# Patient Record
Sex: FEMALE | Race: WHITE | Employment: FULL TIME | URBAN - METROPOLITAN AREA
[De-identification: names, ages, dates, MRNs, and addresses within clinical notes are randomized per-mention and may not be internally consistent; named-entity substitution may affect disease eponyms.]

---

## 2020-01-05 LAB
A/G RATIO: 0.8 (ref 1.1–2.2)
ALBUMIN SERPL-MCNC: 3.2 G/DL (ref 3.4–5)
ALP BLD-CCNC: 111 U/L (ref 40–129)
ALT SERPL-CCNC: 17 U/L (ref 10–40)
ANION GAP SERPL CALCULATED.3IONS-SCNC: 14 MMOL/L (ref 3–16)
ANISOCYTOSIS: ABNORMAL
AST SERPL-CCNC: 15 U/L (ref 15–37)
BACTERIA: ABNORMAL /HPF
BASOPHILS ABSOLUTE: 0 K/UL (ref 0–0.2)
BASOPHILS RELATIVE PERCENT: 0.3 %
BILIRUB SERPL-MCNC: 0.3 MG/DL (ref 0–1)
BILIRUBIN URINE: ABNORMAL
BLOOD, URINE: ABNORMAL
BUN BLDV-MCNC: 13 MG/DL (ref 7–20)
CALCIUM SERPL-MCNC: 10.3 MG/DL (ref 8.3–10.6)
CHLORIDE BLD-SCNC: 98 MMOL/L (ref 99–110)
CLARITY: ABNORMAL
CO2: 20 MMOL/L (ref 21–32)
COLOR: YELLOW
CREAT SERPL-MCNC: 0.7 MG/DL (ref 0.6–1.1)
EOSINOPHILS ABSOLUTE: 0 K/UL (ref 0–0.6)
EOSINOPHILS RELATIVE PERCENT: 0.4 %
EPITHELIAL CELLS, UA: 3 /HPF (ref 0–5)
GFR AFRICAN AMERICAN: >60
GFR NON-AFRICAN AMERICAN: >60
GLOBULIN: 3.9 G/DL
GLUCOSE BLD-MCNC: 132 MG/DL (ref 70–99)
GLUCOSE URINE: NEGATIVE MG/DL
HCG QUALITATIVE: NEGATIVE
HCT VFR BLD CALC: 34.6 % (ref 36–48)
HEMATOLOGY PATH CONSULT: YES
HEMOGLOBIN: 11.2 G/DL (ref 12–16)
HYALINE CASTS: 5 /LPF (ref 0–8)
KETONES, URINE: NEGATIVE MG/DL
LEUKOCYTE ESTERASE, URINE: ABNORMAL
LIPASE: 15 U/L (ref 13–60)
LYMPHOCYTES ABSOLUTE: 1.8 K/UL (ref 1–5.1)
LYMPHOCYTES RELATIVE PERCENT: 13.3 %
MCH RBC QN AUTO: 22.5 PG (ref 26–34)
MCHC RBC AUTO-ENTMCNC: 32.3 G/DL (ref 31–36)
MCV RBC AUTO: 69.8 FL (ref 80–100)
MICROCYTES: ABNORMAL
MICROSCOPIC EXAMINATION: YES
MONOCYTES ABSOLUTE: 0.9 K/UL (ref 0–1.3)
MONOCYTES RELATIVE PERCENT: 6.9 %
NEUTROPHILS ABSOLUTE: 10.4 K/UL (ref 1.7–7.7)
NEUTROPHILS RELATIVE PERCENT: 79.1 %
NITRITE, URINE: POSITIVE
OVALOCYTES: ABNORMAL
PDW BLD-RTO: 16.8 % (ref 12.4–15.4)
PH UA: 5.5 (ref 5–8)
PLATELET # BLD: 287 K/UL (ref 135–450)
PLATELET SLIDE REVIEW: ADEQUATE
PMV BLD AUTO: 8.2 FL (ref 5–10.5)
POTASSIUM SERPL-SCNC: 3.8 MMOL/L (ref 3.5–5.1)
PROTEIN UA: >=300 MG/DL
RBC # BLD: 4.96 M/UL (ref 4–5.2)
RBC UA: 7 /HPF (ref 0–4)
SLIDE REVIEW: ABNORMAL
SODIUM BLD-SCNC: 132 MMOL/L (ref 136–145)
SPECIFIC GRAVITY UA: >1.03 (ref 1–1.03)
TOTAL PROTEIN: 7.1 G/DL (ref 6.4–8.2)
URINE REFLEX TO CULTURE: YES
URINE TYPE: ABNORMAL
UROBILINOGEN, URINE: 0.2 E.U./DL
WBC # BLD: 13.1 K/UL (ref 4–11)
WBC UA: 621 /HPF (ref 0–5)

## 2020-01-05 PROCEDURE — 81001 URINALYSIS AUTO W/SCOPE: CPT

## 2020-01-05 PROCEDURE — 85025 COMPLETE CBC W/AUTO DIFF WBC: CPT

## 2020-01-05 PROCEDURE — 84703 CHORIONIC GONADOTROPIN ASSAY: CPT

## 2020-01-05 PROCEDURE — 85610 PROTHROMBIN TIME: CPT

## 2020-01-05 PROCEDURE — 87077 CULTURE AEROBIC IDENTIFY: CPT

## 2020-01-05 PROCEDURE — 83690 ASSAY OF LIPASE: CPT

## 2020-01-05 PROCEDURE — 80053 COMPREHEN METABOLIC PANEL: CPT

## 2020-01-05 PROCEDURE — 93005 ELECTROCARDIOGRAM TRACING: CPT | Performed by: EMERGENCY MEDICINE

## 2020-01-05 PROCEDURE — 87186 SC STD MICRODIL/AGAR DIL: CPT

## 2020-01-05 PROCEDURE — 87086 URINE CULTURE/COLONY COUNT: CPT

## 2020-01-06 ENCOUNTER — ANESTHESIA (OUTPATIENT)
Dept: OPERATING ROOM | Age: 45
DRG: 854 | End: 2020-01-06
Payer: COMMERCIAL

## 2020-01-06 ENCOUNTER — APPOINTMENT (OUTPATIENT)
Dept: CT IMAGING | Age: 45
DRG: 854 | End: 2020-01-06
Payer: COMMERCIAL

## 2020-01-06 ENCOUNTER — APPOINTMENT (OUTPATIENT)
Dept: GENERAL RADIOLOGY | Age: 45
DRG: 854 | End: 2020-01-06
Payer: COMMERCIAL

## 2020-01-06 ENCOUNTER — HOSPITAL ENCOUNTER (INPATIENT)
Age: 45
LOS: 2 days | Discharge: HOME | DRG: 854 | End: 2020-01-08
Attending: EMERGENCY MEDICINE | Admitting: INTERNAL MEDICINE
Payer: COMMERCIAL

## 2020-01-06 ENCOUNTER — ANESTHESIA EVENT (OUTPATIENT)
Dept: OPERATING ROOM | Age: 45
DRG: 854 | End: 2020-01-06
Payer: COMMERCIAL

## 2020-01-06 VITALS
SYSTOLIC BLOOD PRESSURE: 145 MMHG | RESPIRATION RATE: 9 BRPM | OXYGEN SATURATION: 100 % | DIASTOLIC BLOOD PRESSURE: 83 MMHG

## 2020-01-06 PROBLEM — N20.0 NEPHROLITHIASIS: Status: ACTIVE | Noted: 2020-01-06

## 2020-01-06 LAB
EKG ATRIAL RATE: 134 BPM
EKG DIAGNOSIS: NORMAL
EKG P AXIS: 52 DEGREES
EKG P-R INTERVAL: 134 MS
EKG Q-T INTERVAL: 266 MS
EKG QRS DURATION: 86 MS
EKG QTC CALCULATION (BAZETT): 397 MS
EKG R AXIS: 7 DEGREES
EKG T AXIS: 47 DEGREES
EKG VENTRICULAR RATE: 134 BPM
HEMATOLOGY PATH CONSULT: NORMAL
INR BLD: 1.19 (ref 0.86–1.14)
LACTIC ACID, SEPSIS: 0.9 MMOL/L (ref 0.4–1.9)
PROTHROMBIN TIME: 13.8 SEC (ref 10–13.2)

## 2020-01-06 PROCEDURE — 2500000003 HC RX 250 WO HCPCS: Performed by: NURSE ANESTHETIST, CERTIFIED REGISTERED

## 2020-01-06 PROCEDURE — 6360000004 HC RX CONTRAST MEDICATION: Performed by: PHYSICIAN ASSISTANT

## 2020-01-06 PROCEDURE — 6360000002 HC RX W HCPCS: Performed by: NURSE ANESTHETIST, CERTIFIED REGISTERED

## 2020-01-06 PROCEDURE — 6360000002 HC RX W HCPCS: Performed by: INTERNAL MEDICINE

## 2020-01-06 PROCEDURE — 2580000003 HC RX 258: Performed by: EMERGENCY MEDICINE

## 2020-01-06 PROCEDURE — 2580000003 HC RX 258: Performed by: PHYSICIAN ASSISTANT

## 2020-01-06 PROCEDURE — 74177 CT ABD & PELVIS W/CONTRAST: CPT

## 2020-01-06 PROCEDURE — 3700000000 HC ANESTHESIA ATTENDED CARE: Performed by: UROLOGY

## 2020-01-06 PROCEDURE — 87186 SC STD MICRODIL/AGAR DIL: CPT

## 2020-01-06 PROCEDURE — C1769 GUIDE WIRE: HCPCS | Performed by: UROLOGY

## 2020-01-06 PROCEDURE — C2617 STENT, NON-COR, TEM W/O DEL: HCPCS | Performed by: UROLOGY

## 2020-01-06 PROCEDURE — 7100000001 HC PACU RECOVERY - ADDTL 15 MIN: Performed by: UROLOGY

## 2020-01-06 PROCEDURE — 3600000014 HC SURGERY LEVEL 4 ADDTL 15MIN: Performed by: UROLOGY

## 2020-01-06 PROCEDURE — 6370000000 HC RX 637 (ALT 250 FOR IP): Performed by: EMERGENCY MEDICINE

## 2020-01-06 PROCEDURE — 3700000001 HC ADD 15 MINUTES (ANESTHESIA): Performed by: UROLOGY

## 2020-01-06 PROCEDURE — 3600000004 HC SURGERY LEVEL 4 BASE: Performed by: UROLOGY

## 2020-01-06 PROCEDURE — 96375 TX/PRO/DX INJ NEW DRUG ADDON: CPT

## 2020-01-06 PROCEDURE — BT1F1ZZ FLUOROSCOPY OF LEFT KIDNEY, URETER AND BLADDER USING LOW OSMOLAR CONTRAST: ICD-10-PCS | Performed by: UROLOGY

## 2020-01-06 PROCEDURE — 74420 UROGRAPHY RTRGR +-KUB: CPT

## 2020-01-06 PROCEDURE — 87150 DNA/RNA AMPLIFIED PROBE: CPT

## 2020-01-06 PROCEDURE — 6360000002 HC RX W HCPCS: Performed by: PHYSICIAN ASSISTANT

## 2020-01-06 PROCEDURE — 96366 THER/PROPH/DIAG IV INF ADDON: CPT

## 2020-01-06 PROCEDURE — 6360000002 HC RX W HCPCS: Performed by: EMERGENCY MEDICINE

## 2020-01-06 PROCEDURE — 6360000002 HC RX W HCPCS: Performed by: ANESTHESIOLOGY

## 2020-01-06 PROCEDURE — 7100000000 HC PACU RECOVERY - FIRST 15 MIN: Performed by: UROLOGY

## 2020-01-06 PROCEDURE — 93010 ELECTROCARDIOGRAM REPORT: CPT | Performed by: INTERNAL MEDICINE

## 2020-01-06 PROCEDURE — 2580000003 HC RX 258: Performed by: INTERNAL MEDICINE

## 2020-01-06 PROCEDURE — 2580000003 HC RX 258: Performed by: UROLOGY

## 2020-01-06 PROCEDURE — 99285 EMERGENCY DEPT VISIT HI MDM: CPT

## 2020-01-06 PROCEDURE — 6370000000 HC RX 637 (ALT 250 FOR IP): Performed by: UROLOGY

## 2020-01-06 PROCEDURE — 96367 TX/PROPH/DG ADDL SEQ IV INF: CPT

## 2020-01-06 PROCEDURE — 2709999900 HC NON-CHARGEABLE SUPPLY: Performed by: UROLOGY

## 2020-01-06 PROCEDURE — 1200000000 HC SEMI PRIVATE

## 2020-01-06 PROCEDURE — 87040 BLOOD CULTURE FOR BACTERIA: CPT

## 2020-01-06 PROCEDURE — 6360000004 HC RX CONTRAST MEDICATION: Performed by: UROLOGY

## 2020-01-06 PROCEDURE — 83605 ASSAY OF LACTIC ACID: CPT

## 2020-01-06 PROCEDURE — 0T778DZ DILATION OF LEFT URETER WITH INTRALUMINAL DEVICE, VIA NATURAL OR ARTIFICIAL OPENING ENDOSCOPIC: ICD-10-PCS | Performed by: UROLOGY

## 2020-01-06 PROCEDURE — 94760 N-INVAS EAR/PLS OXIMETRY 1: CPT

## 2020-01-06 PROCEDURE — 96365 THER/PROPH/DIAG IV INF INIT: CPT

## 2020-01-06 PROCEDURE — C1758 CATHETER, URETERAL: HCPCS | Performed by: UROLOGY

## 2020-01-06 DEVICE — STENT URET 6FR L26CM PERCFLX HYDR+ TAPR TIP GRAD: Type: IMPLANTABLE DEVICE | Status: FUNCTIONAL

## 2020-01-06 RX ORDER — TAMSULOSIN HYDROCHLORIDE 0.4 MG/1
0.4 CAPSULE ORAL DAILY
Status: DISCONTINUED | OUTPATIENT
Start: 2020-01-06 | End: 2020-01-08 | Stop reason: HOSPADM

## 2020-01-06 RX ORDER — SODIUM CHLORIDE 0.9 % (FLUSH) 0.9 %
10 SYRINGE (ML) INJECTION EVERY 12 HOURS SCHEDULED
Status: DISCONTINUED | OUTPATIENT
Start: 2020-01-06 | End: 2020-01-08 | Stop reason: HOSPADM

## 2020-01-06 RX ORDER — ACETAMINOPHEN 500 MG
1000 TABLET ORAL ONCE
Status: COMPLETED | OUTPATIENT
Start: 2020-01-06 | End: 2020-01-06

## 2020-01-06 RX ORDER — HYDROMORPHONE HCL 110MG/55ML
0.5 PATIENT CONTROLLED ANALGESIA SYRINGE INTRAVENOUS EVERY 5 MIN PRN
Status: DISCONTINUED | OUTPATIENT
Start: 2020-01-06 | End: 2020-01-06 | Stop reason: HOSPADM

## 2020-01-06 RX ORDER — SUCCINYLCHOLINE/SOD CL,ISO/PF 200MG/10ML
SYRINGE (ML) INTRAVENOUS PRN
Status: DISCONTINUED | OUTPATIENT
Start: 2020-01-06 | End: 2020-01-06 | Stop reason: SDUPTHER

## 2020-01-06 RX ORDER — KETOROLAC TROMETHAMINE 30 MG/ML
30 INJECTION, SOLUTION INTRAMUSCULAR; INTRAVENOUS ONCE
Status: COMPLETED | OUTPATIENT
Start: 2020-01-06 | End: 2020-01-06

## 2020-01-06 RX ORDER — SODIUM CHLORIDE 0.9 % (FLUSH) 0.9 %
10 SYRINGE (ML) INJECTION PRN
Status: DISCONTINUED | OUTPATIENT
Start: 2020-01-06 | End: 2020-01-08 | Stop reason: HOSPADM

## 2020-01-06 RX ORDER — ACETAMINOPHEN 325 MG/1
650 TABLET ORAL EVERY 4 HOURS PRN
Status: DISCONTINUED | OUTPATIENT
Start: 2020-01-06 | End: 2020-01-08 | Stop reason: HOSPADM

## 2020-01-06 RX ORDER — 0.9 % SODIUM CHLORIDE 0.9 %
30 INTRAVENOUS SOLUTION INTRAVENOUS ONCE
Status: COMPLETED | OUTPATIENT
Start: 2020-01-06 | End: 2020-01-06

## 2020-01-06 RX ORDER — PROMETHAZINE HYDROCHLORIDE 25 MG/ML
6.25 INJECTION, SOLUTION INTRAMUSCULAR; INTRAVENOUS
Status: DISCONTINUED | OUTPATIENT
Start: 2020-01-06 | End: 2020-01-06 | Stop reason: HOSPADM

## 2020-01-06 RX ORDER — SODIUM CHLORIDE 9 MG/ML
INJECTION, SOLUTION INTRAVENOUS CONTINUOUS
Status: DISCONTINUED | OUTPATIENT
Start: 2020-01-06 | End: 2020-01-07

## 2020-01-06 RX ORDER — ONDANSETRON 2 MG/ML
4 INJECTION INTRAMUSCULAR; INTRAVENOUS ONCE
Status: COMPLETED | OUTPATIENT
Start: 2020-01-06 | End: 2020-01-06

## 2020-01-06 RX ORDER — SODIUM CHLORIDE 0.9 % (FLUSH) 0.9 %
10 SYRINGE (ML) INJECTION EVERY 12 HOURS SCHEDULED
Status: DISCONTINUED | OUTPATIENT
Start: 2020-01-06 | End: 2020-01-06 | Stop reason: HOSPADM

## 2020-01-06 RX ORDER — ONDANSETRON 2 MG/ML
4 INJECTION INTRAMUSCULAR; INTRAVENOUS EVERY 6 HOURS PRN
Status: DISCONTINUED | OUTPATIENT
Start: 2020-01-06 | End: 2020-01-08 | Stop reason: HOSPADM

## 2020-01-06 RX ORDER — MAGNESIUM HYDROXIDE 1200 MG/15ML
LIQUID ORAL
Status: COMPLETED | OUTPATIENT
Start: 2020-01-06 | End: 2020-01-06

## 2020-01-06 RX ORDER — LIDOCAINE HYDROCHLORIDE 20 MG/ML
INJECTION, SOLUTION EPIDURAL; INFILTRATION; INTRACAUDAL; PERINEURAL PRN
Status: DISCONTINUED | OUTPATIENT
Start: 2020-01-06 | End: 2020-01-06 | Stop reason: SDUPTHER

## 2020-01-06 RX ORDER — FENTANYL CITRATE 50 UG/ML
25 INJECTION, SOLUTION INTRAMUSCULAR; INTRAVENOUS EVERY 5 MIN PRN
Status: DISCONTINUED | OUTPATIENT
Start: 2020-01-06 | End: 2020-01-06 | Stop reason: HOSPADM

## 2020-01-06 RX ORDER — PROPOFOL 10 MG/ML
INJECTION, EMULSION INTRAVENOUS PRN
Status: DISCONTINUED | OUTPATIENT
Start: 2020-01-06 | End: 2020-01-06 | Stop reason: SDUPTHER

## 2020-01-06 RX ORDER — SODIUM CHLORIDE 9 MG/ML
1000 INJECTION, SOLUTION INTRAVENOUS CONTINUOUS
Status: DISCONTINUED | OUTPATIENT
Start: 2020-01-06 | End: 2020-01-06

## 2020-01-06 RX ORDER — LABETALOL HYDROCHLORIDE 5 MG/ML
5 INJECTION, SOLUTION INTRAVENOUS EVERY 10 MIN PRN
Status: DISCONTINUED | OUTPATIENT
Start: 2020-01-06 | End: 2020-01-06 | Stop reason: HOSPADM

## 2020-01-06 RX ORDER — PHENAZOPYRIDINE HYDROCHLORIDE 100 MG/1
200 TABLET, FILM COATED ORAL
Status: DISCONTINUED | OUTPATIENT
Start: 2020-01-06 | End: 2020-01-08 | Stop reason: HOSPADM

## 2020-01-06 RX ORDER — SODIUM CHLORIDE 0.9 % (FLUSH) 0.9 %
10 SYRINGE (ML) INJECTION PRN
Status: DISCONTINUED | OUTPATIENT
Start: 2020-01-06 | End: 2020-01-06 | Stop reason: HOSPADM

## 2020-01-06 RX ADMIN — IOPAMIDOL 75 ML: 755 INJECTION, SOLUTION INTRAVENOUS at 01:56

## 2020-01-06 RX ADMIN — LIDOCAINE HYDROCHLORIDE 5 ML: 20 INJECTION, SOLUTION EPIDURAL; INFILTRATION; INTRACAUDAL; PERINEURAL at 08:08

## 2020-01-06 RX ADMIN — Medication 1 G: at 20:14

## 2020-01-06 RX ADMIN — SODIUM CHLORIDE: 9 INJECTION, SOLUTION INTRAVENOUS at 18:59

## 2020-01-06 RX ADMIN — PHENAZOPYRIDINE HYDROCHLORIDE 200 MG: 100 TABLET ORAL at 20:14

## 2020-01-06 RX ADMIN — SODIUM CHLORIDE 1000 ML: 9 INJECTION, SOLUTION INTRAVENOUS at 03:57

## 2020-01-06 RX ADMIN — ACETAMINOPHEN 1000 MG: 500 TABLET, FILM COATED ORAL at 03:58

## 2020-01-06 RX ADMIN — PROPOFOL 200 MG: 10 INJECTION, EMULSION INTRAVENOUS at 08:08

## 2020-01-06 RX ADMIN — KETOROLAC TROMETHAMINE 30 MG: 30 INJECTION, SOLUTION INTRAMUSCULAR at 01:11

## 2020-01-06 RX ADMIN — FENTANYL CITRATE 100 MCG: 50 INJECTION, SOLUTION INTRAMUSCULAR; INTRAVENOUS at 08:08

## 2020-01-06 RX ADMIN — Medication 180 MG: at 08:08

## 2020-01-06 RX ADMIN — GENTAMICIN SULFATE 379.6 MG: 40 INJECTION, SOLUTION INTRAMUSCULAR; INTRAVENOUS at 03:58

## 2020-01-06 RX ADMIN — ONDANSETRON 4 MG: 2 INJECTION INTRAMUSCULAR; INTRAVENOUS at 01:11

## 2020-01-06 RX ADMIN — SODIUM CHLORIDE 1365 ML: 9 INJECTION, SOLUTION INTRAVENOUS at 01:11

## 2020-01-06 RX ADMIN — Medication 10 ML: at 20:15

## 2020-01-06 RX ADMIN — CEFEPIME HYDROCHLORIDE 2 G: 2 INJECTION, POWDER, FOR SOLUTION INTRAVENOUS at 01:11

## 2020-01-06 ASSESSMENT — ENCOUNTER SYMPTOMS
RESPIRATORY NEGATIVE: 1
BACK PAIN: 0
EYE DISCHARGE: 0
NAUSEA: 1
COUGH: 0
VOMITING: 1
ABDOMINAL PAIN: 1
CONSTIPATION: 0
DIARRHEA: 0
EYE REDNESS: 0
SHORTNESS OF BREATH: 0
COLOR CHANGE: 0

## 2020-01-06 ASSESSMENT — PAIN - FUNCTIONAL ASSESSMENT: PAIN_FUNCTIONAL_ASSESSMENT: 0-10

## 2020-01-06 ASSESSMENT — PAIN SCALES - GENERAL
PAINLEVEL_OUTOF10: 0
PAINLEVEL_OUTOF10: 8
PAINLEVEL_OUTOF10: 0
PAINLEVEL_OUTOF10: 8
PAINLEVEL_OUTOF10: 0

## 2020-01-06 NOTE — ED PROVIDER NOTES
I independently performed a history and physical on SAMMY Avery 54. All diagnostic, treatment, and disposition decisions were made by myself in conjunction with the advanced practice provider. Briefly, this is a 38 y.o. female here for approximately 4 days of left-sided pain and fevers. Patient thought that it might of been due to her gastric surgery. The pain became intolerable. Fevers were difficult to control so she decided to come to the emergency room. On exam, nontoxic-appearing adult female who is now comfortable after having received pain medicines. She was febrile. Tachycardic. Blood pressure is stable. Has a left CVA tenderness. EKG  The Ekg interpreted by me in the absence of a cardiologist shows:    No significant change from prior EKG dated         Screenings            Critical Time  Total Critical Care time was 35 minutes, excluding separately reportable procedures. There was a high probability of clinically significant/life threatening deterioration in the patient's condition which required my urgent intervention. MDM  This patient again and appeared to be septic. She is given 30 cc/kg bolus of normal saline based on ideal body weight and given cefepime based on the likely source of urinary tract. CT scan showed an obstructing stone. The patient has a confirmed urinary tract infection. Urology is consulted. They recommend admission for stent. Patient is made n.p.o. She is planning stents once and gentamicin is added on per urology's recommendations. This patient discussed with the hospitalist on duty. The patient is agreeable with the plan for admission. Patient Referrals:  No follow-up provider specified. Discharge Medications:  New Prescriptions    No medications on file       FINAL IMPRESSION  1. Septicemia ([address]    2. Hydronephrosis with urinary obstruction due to ureteral calculus    3.  Pyelonephritis        Blood pressure 120/87, pulse 99, temperature 99.2 °F (37.3 °C), resp. rate 18, height 5' (1.524 m), weight 267 lb 12.8 oz (121.5 kg), SpO2 96 %. For further details of [facility] emergency department encounter, please see documentation by advanced practice provider.       Leeanne Goodman MD  01/06/20 [id number]

## 2020-01-06 NOTE — CONSULTS
surgically absent.  The  liver, spleen, pancreas, adrenal glands, kidneys, are otherwise unremarkable  in appearance. GI/Bowel: The appendix is not clearly visualized and no evidence of acute  appendicitis is noted.  The stomach is unremarkable without wall thickening  or distention.  Bowel loops are unremarkable in appearance without evidence  of obstruction, distension or mucosal thickening. Pelvis: Suspected multifocal uterine leiomyomata are seen with the largest at  the right fundus measuring up to 3.9 cm in diameter.  The uterus is  anteverted in position; intrauterine contraceptive device appears well  positioned within the endometrial canal.  Urinary bladder is mildly distended  but grossly unremarkable.  No evidence of pelvic free fluid is seen.  No  pelvic or inguinal lymphadenopathy is identified. Peritoneum/Retroperitoneum: No evidence of retroperitoneal or intraperitoneal  lymphadenopathy is identified.  No evidence of intraperitoneal free fluid is  seen. Bones/Soft Tissues: The bones, skeletal muscle bundles, fascial planes and  subcutaneous soft tissues are unremarkable in appearance. Impression:     1. Mid left ureteral obstructing 6 mm diameter calculus with associated  moderate left ureterectasis and moderate left hydronephrosis, with findings  as discussed above. 2. Diffuse fatty infiltration of liver. 3. Hepatomegaly. 4. Suspected multifocal uterine leiomyomata, as discussed above. 5. Cholecystectomy.          Janina Michelle MD  1/6/2020

## 2020-01-06 NOTE — ED PROVIDER NOTES
[facility]        Pt Name: Morro Siu  MRN: [id number]  [place] 1975  Date of evaluation: 1/5/2020  Provider: LJ Carney  PCP: No primary care provider on file. This patient was seen and evaluated by the attending physician Rolan Calles       Chief Complaint   Patient presents with    Fever     started with L sided lower abdominal pain on friday, then with fever. pain is better but fever is still there off and on. HISTORY OF PRESENT ILLNESS   (Location/Symptom, Timing/Onset, Context/Setting, Quality, Duration, Modifying Factors, Severity)  Note limiting factors. Morro Siu is a 38 y.o. female with no significant past medical history who presents to the ED with plan of a fever. Patient states has had a fever that began on Friday. States she started having some left-sided lower abdominal pain and left-sided flank pain. Patient states she has had history of gastric bypass and states she is also had cholecystectomy. Patient became concerned due to persistent fever and came to the ED for further evaluation and treatment. Has been taking Tylenol but states due to her gastric bypass she is unsure how much she is actually absorbing. Patient has not tried any other over-the-counter antipyretics. States she has had nausea with vomiting. States decreased oral intake. Denies chest pain, shortness of breath, cough, rashes/lesions, dysuria, frequency, urgency, hematuria, vaginal bleeding, vaginal discharge or changes in bowel movements. Patient states aching pain rated 4/10 to her left flank. Denies sick contacts or recent travel. Denies any other surgeries to her abdomen in the past.    Nursing Notes were all reviewed and agreed with or any disagreements were addressed in the HPI.     REVIEW OF SYSTEMS    (2-9 systems for level 4, 10 or more for level 5)     Review of Systems Constitutional: Positive for fever. Negative for activity change, appetite change and chills. Eyes: Negative for discharge and redness. Respiratory: Negative. Negative for cough and shortness of breath. Cardiovascular: Negative. Negative for chest pain. Gastrointestinal: Positive for abdominal pain, nausea and vomiting. Negative for constipation and diarrhea. Genitourinary: Positive for flank pain. Negative for decreased urine volume, difficulty urinating, dysuria, frequency, hematuria, pelvic pain, urgency, vaginal bleeding, vaginal discharge and vaginal pain. Musculoskeletal: Negative for arthralgias, back pain, myalgias, neck pain and neck stiffness. Skin: Negative for color change, pallor, rash and wound. Neurological: Negative for dizziness, light-headedness and headaches. Positives and Pertinent negatives as per HPI. Except as noted above in the ROS, all other systems were reviewed and negative. PAST MEDICAL HISTORY   History reviewed. No pertinent past medical history. SURGICAL HISTORY     Past Surgical History:   Procedure Laterality Date    CHOLECYSTECTOMY      GASTRIC BYPASS SURGERY      TYMPANOPLASTY           CURRENTMEDICATIONS       Previous Medications    No medications on file         ALLERGIES     Latex; Adhesive tape; Codeine; Other; and Percocet [oxycodone-acetaminophen]    FAMILYHISTORY     History reviewed. No pertinent family history.        SOCIAL HISTORY       Social History     Tobacco Use    Smoking status: Never Smoker    Smokeless tobacco: Never Used   Substance Use Topics    Alcohol use: Not Currently    Drug use: Never       SCREENINGS             PHYSICAL EXAM    (up to 7 for level 4, 8 or more for level 5)     ED Triage Vitals   BP Temp Temp Source Pulse Resp SpO2 Height Weight   01/05/20 1957 01/05/20 1957 01/05/20 1957 01/05/20 1957 01/05/20 1957 01/05/20 1957 01/05/20 1957 01/05/20 2003   (!) 147/98 98.5 °F (36.9 °C) Oral 145 18 96 % 5' flank pain. Complaint of left lower quadrant with left flank pain with associated fever. Work-up obtained per triage. Unfortunately patient had extended stay in the waiting room and upon my evaluation patient only had major completion of work-up at this time. Urinalysis did show 4+ bacteria with 621 white blood cells and 7 red blood cells. Positive nitrite and concerning for potential pyelonephritis/UTI given history. Patient tachycardic with fever here in the ED. IV established patient given fluid bolus at 30 mL/kg bolus based on ideal body weight given BMI greater than 30. Patient given Toradol for fever control. Also given Zofran and cefepime given what appears to be concern for potential sepsis from what appears to be UTI versus pyelonephritis. Lactic acid normal.  Blood cultures pending at this time. CBC showed white count 13.1 with normal platelets. Hemoglobin 11.2. CMP showed CO2 20 but otherwise unremarkable. Pregnancy negative. Lipase normal.  INR 1.19. EKG interpreted by attending physician. CT of the abdomen pelvis with IV contrast ordered and pending at this time. Given end of shift will sign out to attending provider for further valuation/disposition. Patient has concern for sepsis with tachycardia, fever, documented infection with UTI/pyelonephritis and leukocytosis. Patient already treated with 30 mL/kg bolus of fluid and Toradol/cefepime here in the ED. Pending CT of the abdomen pelvis and disposition pending further evaluation by attending. Given end of shift will sign out to 10 provider. Please see their documentation for further disposition and treatment of patient. FINAL IMPRESSION    No diagnosis found. DISPOSITION/PLAN   DISPOSITION        PATIENT REFERREDTO:  No follow-up provider specified.     DISCHARGE MEDICATIONS:  New Prescriptions    No medications on file       DISCONTINUED MEDICATIONS:  Discontinued Medications    No medications on file

## 2020-01-06 NOTE — OP NOTE
Urology Operative Report  [facility]    Provider: Shell Schofield MD Patient ID:  Admission Date: 2020 Name: Morro Siu  OR Date: 2020  MRN: [id number]   Patient Location: OR/NONE : 1975  Attending: Atif Doan MD Date of Service: 2020  PCP: No primary care provider on file. Date of Operation: 2020    Preoperative Diagnosis:   1. LEFT side ureteral stone  2. Urosepsis    Postoperative Diagnosis: same    Procedure:    1. Cystoscopy  2. Left side ureteral stent placement  3. Fluoroscopic imaging < 1 hr physician time  4. LEFT side retrograde ureteropyelogram    Surgeon:   Shell Schofield MD    Anesthesia: General LMA anesthesia    Indications: Morro Siu is a 38 y.o. female who presents for the above named surgery. Informed consent was obtained and the risks, benefits, and details of the procedure were explained to the patient who elected to proceed. Details of Procedure: The patient was brought to the operating room and placed in the supine position on the operating room table. SCDs were placed on the lower extremities. Following induction of anesthesia the patient was positioned in a lithotomy position, all pressure points were padded, and the genitals were prepped and draped in the usual sterile fashion. A routine timeout was performed, confirming the patient, procedure, site, risk of fire, patient allergies and confirming that preoperative antibiotics had been administered prior to beginning. A 21 fr rigid cystoscope was advance via a normal appearing urethra into the bladder. The bladder was inspected and there were no suspicious lesions, stones or diverticula seen. Attention was turned to the left ureteral orifice. A 0.035 sensor wire was advance into the left UO and up to the renal pelvis under control of fluoroscopy. There was slight resistance felt at the expected level of the stone based on the CT scan.  Over the wire a 5 fr pollock

## 2020-01-06 NOTE — ANESTHESIA POSTPROCEDURE EVALUATION
[facility] Department of Anesthesiology  Post-Anesthesia Note       Name:  Neel Pimentel                                  Age:  38 y.o. MRN:  [id number]     Last Vitals & Oxygen Saturation: /86   Pulse 111   Temp 98.6 °F (37 °C) (Temporal)   Resp 14   Ht 5' (1.524 m)   Wt 267 lb 12.8 oz (121.5 kg)   SpO2 97%   BMI 52.30 kg/m²   Patient Vitals for the past 4 hrs:   BP Temp Temp src Pulse Resp SpO2   01/06/20 0900 -- -- -- -- 14 --   01/06/20 0845 137/86 -- -- 111 14 97 %   01/06/20 0840 (!) 149/93 98.6 °F (37 °C) Temporal 109 13 100 %   01/06/20 0835 (!) 137/115 -- -- 115 16 100 %   01/06/20 0830 124/88 -- -- 128 18 100 %   01/06/20 0825 136/82 98.4 °F (36.9 °C) Temporal 126 18 99 %   01/06/20 0735 (!) 150/96 100.1 °F (37.8 °C) Temporal 121 20 99 %   01/06/20 0630 127/68 -- -- 102 -- 95 %   01/06/20 0615 -- -- -- -- -- 95 %   01/06/20 0600 127/72 -- -- -- -- 95 %   01/06/20 0545 -- -- -- -- -- 94 %   01/06/20 0530 113/66 -- -- -- -- 94 %       Level of consciousness:  Awake, alert    Respiratory: Respirations easy, no distress. Stable. Cardiovascular: Hemodynamically stable. Hydration: Adequate. PONV: Adequately managed. Post-op pain: Adequately controlled. Post-op assessment: Tolerated anesthetic well without complication. Complications:  None.     Zackery De Dios MD  January 6, 2020   9:17 AM

## 2020-01-06 NOTE — H&P
HOSPITALISTS HISTORY AND PHYSICAL    1/6/2020 2:59 PM    Patient Information:  Linda Huerta is a 38 y.o. female [id number]  PCP:  No primary care provider on file. (Tel: None )    Chief complaint:    Chief Complaint   Patient presents with    Fever     started with L sided lower abdominal pain on friday, then with fever. pain is better but fever is still there off and on. History of Present Illness:  Tania Lopez is a 38 y.o. female who presented with history of fevers of 101.4 associated with chills and sweats. She later on developed left flank pain that was mild not worse or better with anything. She also had multiple episodes of vomiting unable to take p.o. intake. Patient denies any sick contacts or recent travel. The patient decided to keep him to the ED as she is not improving. The ED CT scan was done showed left hydronephrosis with 6 mm stone and patient was taken to the OR by urology and had a left ureteral stent placed            REVIEW OF SYSTEMS:   Constitutional: The above  ENT: Negative for rhinorrhea, epistaxis, hoarseness, sore throat. Respiratory: Negative for shortness of breath,wheezing  Cardiovascular: Negative for chest pain, palpitations   Gastrointestinal: Negative for nausea, vomiting, diarrhea  Genitourinary: Negative for polyuria, dysuria   Hematologic/Lymphatic: Negative for bleeding tendency, easy bruising  Musculoskeletal: Negative for myalgias and arthralgias  Neurologic: Negative for confusion,dysarthria. Skin: Negative for itching,rash  Psychiatric: Negative for depression,anxiety, agitation. Endocrine: Negative for polydipsia,polyuria,heat /cold intolerance. Past Medical History:  No medical problems  Past Surgical History:   has a past surgical history that includes Gastric bypass surgery; Tympanoplasty; and Cholecystectomy.

## 2020-01-06 NOTE — ANESTHESIA PRE PROCEDURE
[facility] Department of Anesthesiology  Pre-Anesthesia Evaluation/Consultation       Name:  Mike Mcmahon  : 1975  Age:  38 y.o. MRN:  [id number]  Date: 2020           Surgeon: Surgeon(s):  Haris Aguilar MD    Procedure: Procedure(s):  CYSTOSCOPY, LEFT RETROGRADE PYELOGRAM     Allergies   Allergen Reactions    Latex     Adhesive Tape     Codeine Nausea And Vomiting     N/V    Other Nausea And Vomiting     sorbital  N/V    Percocet [Oxycodone-Acetaminophen] Nausea And Vomiting     Patient Active Problem List   Diagnosis    Nephrolithiasis     History reviewed. No pertinent past medical history. Past Surgical History:   Procedure Laterality Date    CHOLECYSTECTOMY      GASTRIC BYPASS SURGERY      TYMPANOPLASTY       Social History     Tobacco Use    Smoking status: Never Smoker    Smokeless tobacco: Never Used   Substance Use Topics    Alcohol use: Not Currently    Drug use: Never     Medications  No current facility-administered medications on file prior to encounter. No current outpatient medications on file prior to encounter.      Current Facility-Administered Medications   Medication Dose Route Frequency Provider Last Rate Last Dose    0.9 % sodium chloride infusion  1,000 mL Intravenous Continuous Camron Espinal  mL/hr at 20 0357 1,000 mL at 20 0357     Vital Signs (Current)   Vitals:    20 0735   BP: (!) 150/96   Pulse: 121   Resp: 20   Temp: 100.1 °F (37.8 °C)   SpO2: 99%     Vital Signs Statistics (for past 48 hrs)     Temp  Av.9 °F (37.7 °C)  Min: 98.5 °F (36.9 °C)   Min taken time: 20  Max: 101.2 °F (38.4 °C)   Max taken time: 20  Pulse  Av.8  Min: 80   Min taken time: 20 0403  Max: 145   Max taken time: 20  Resp  Av.7  Min: 25   Min taken time: 20  Max: 20   Max taken time: 20  BP  Min: 103/45   Min taken time: 20 0330  Max: 150/96   Max taken time: 20 0735  MAP (mmHg)  Av.4  Min: 54   Min taken time: 20 0330  Max: 80   Max taken time: 20 0500  SpO2  Av.4 %  Min: 94 %   Min taken time: 20 0545  Max: 99 %   Max taken time: 20 0735    BP Readings from Last 3 Encounters:   20 (!) 150/96     BMI  Body mass index is 52.3 kg/m². Estimated body mass index is 52.3 kg/m² as calculated from the following:    Height as of this encounter: 5' (1.524 m). Weight as of this encounter: 267 lb 12.8 oz (121.5 kg).     CBC   Lab Results   Component Value Date    WBC 13.1 2020    RBC 4.96 2020    HGB 11.2 2020    HCT 34.6 2020    MCV 69.8 2020    RDW 16.8 2020     2020     CMP    Lab Results   Component Value Date     2020    K 3.8 2020    CL 98 2020    CO2 20 2020    BUN 13 2020    CREATININE 0.7 2020    GFRAA >60 2020    AGRATIO 0.8 2020    LABGLOM >60 2020    GLUCOSE 132 2020    PROT 7.1 2020    CALCIUM 10.3 2020    BILITOT 0.3 2020    ALKPHOS 111 2020    AST 15 2020    ALT 17 2020     BMP    Lab Results   Component Value Date     2020    K 3.8 2020    CL 98 2020    CO2 20 2020    BUN 13 2020    CREATININE 0.7 2020    CALCIUM 10.3 2020    GFRAA >60 2020    LABGLOM >60 2020    GLUCOSE 132 2020     POCGlucose  Recent Labs     20  2018   GLUCOSE 132*      Coags    Lab Results   Component Value Date    PROTIME 13.8 2020    INR 1.19      HCG (If Applicable) No results found for: PREGTESTUR, PREGSERUM, HCG, HCGQUANT   ABGs No results found for: PHART, PO2ART, ALZ8LAX, HJF9DPV, BEART, L4XIROIR   Type & Screen (If Applicable)  No results found for: LABABO, LABRH                         BMI: Wt Readings from Last 3 Encounters:       NPO Status:   Date of last liquid consumption: 01/06/20   Time of last liquid consumption: 0400(with med)   Date of last solid food consumption: 01/05/20      Time of last solid consumption: 1400       Anesthesia Evaluation  Patient summary reviewed no history of anesthetic complications:   Airway: Mallampati: III  TM distance: >3 FB   Neck ROM: full   Dental:          Pulmonary:Negative Pulmonary ROS and normal exam                               Cardiovascular:  Exercise tolerance: good (>4 METS),           Rhythm: regular  Rate: normal           Beta Blocker:  Not on Beta Blocker         Neuro/Psych:   Negative Neuro/Psych ROS              GI/Hepatic/Renal:   (+) morbid obesity          Endo/Other: Negative Endo/Other ROS                    Abdominal:   (+) obese,         Vascular: negative vascular ROS. Anesthesia Plan      general     ASA 3     (R/B/A of procedure and anesthesia described. Patient/family understand and would like to proceed. Questions and concerns addressed.  )  Induction: intravenous. MIPS: Postoperative opioids intended and Prophylactic antiemetics administered. Anesthetic plan and risks discussed with patient. Plan discussed with CRNA. This pre-anesthesia assessment may be used as a history and physical.    DOS STAFF ADDENDUM:    Pt seen and examined, chart reviewed (including anesthesia, drug and allergy history). No interval changes to history and physical examination. Anesthetic plan, risks, benefits, alternatives, and personnel involved discussed with patient. Patient verbalized an understanding and agrees to proceed.       Dimitri Saravia MD  January 6, 2020  7:49 AM

## 2020-01-07 LAB
ANION GAP SERPL CALCULATED.3IONS-SCNC: 12 MMOL/L (ref 3–16)
ANISOCYTOSIS: ABNORMAL
BANDED NEUTROPHILS RELATIVE PERCENT: 1 % (ref 0–7)
BASOPHILS ABSOLUTE: 0 K/UL (ref 0–0.2)
BASOPHILS RELATIVE PERCENT: 0 %
BUN BLDV-MCNC: 12 MG/DL (ref 7–20)
CALCIUM SERPL-MCNC: 10.1 MG/DL (ref 8.3–10.6)
CHLORIDE BLD-SCNC: 103 MMOL/L (ref 99–110)
CO2: 23 MMOL/L (ref 21–32)
CREAT SERPL-MCNC: 0.6 MG/DL (ref 0.6–1.1)
EOSINOPHILS ABSOLUTE: 0 K/UL (ref 0–0.6)
EOSINOPHILS RELATIVE PERCENT: 0 %
ESTIMATED AVERAGE GLUCOSE: 122.6 MG/DL
FERRITIN: 111.4 NG/ML (ref 15–150)
FOLATE: 7.77 NG/ML (ref 4.78–24.2)
GFR AFRICAN AMERICAN: >60
GFR NON-AFRICAN AMERICAN: >60
GLUCOSE BLD-MCNC: 156 MG/DL (ref 70–99)
HBA1C MFR BLD: 5.9 %
HCT VFR BLD CALC: 30 % (ref 36–48)
HEMATOLOGY PATH CONSULT: NO
HEMOGLOBIN: 9.9 G/DL (ref 12–16)
IRON SATURATION: 12 % (ref 15–50)
IRON: 28 UG/DL (ref 37–145)
LYMPHOCYTES ABSOLUTE: 1.7 K/UL (ref 1–5.1)
LYMPHOCYTES RELATIVE PERCENT: 24 %
MCH RBC QN AUTO: 22.9 PG (ref 26–34)
MCHC RBC AUTO-ENTMCNC: 33 G/DL (ref 31–36)
MCV RBC AUTO: 69.5 FL (ref 80–100)
MICROCYTES: ABNORMAL
MONOCYTES ABSOLUTE: 0.4 K/UL (ref 0–1.3)
MONOCYTES RELATIVE PERCENT: 5 %
NEUTROPHILS ABSOLUTE: 5.1 K/UL (ref 1.7–7.7)
NEUTROPHILS RELATIVE PERCENT: 70 %
ORGANISM: ABNORMAL
OVALOCYTES: ABNORMAL
PDW BLD-RTO: 17.2 % (ref 12.4–15.4)
PLATELET # BLD: 339 K/UL (ref 135–450)
PLATELET SLIDE REVIEW: ADEQUATE
PMV BLD AUTO: 8.3 FL (ref 5–10.5)
POTASSIUM REFLEX MAGNESIUM: 3.7 MMOL/L (ref 3.5–5.1)
POTASSIUM SERPL-SCNC: 3.7 MMOL/L (ref 3.5–5.1)
RBC # BLD: 4.31 M/UL (ref 4–5.2)
SLIDE REVIEW: ABNORMAL
SODIUM BLD-SCNC: 138 MMOL/L (ref 136–145)
TOTAL IRON BINDING CAPACITY: 225 UG/DL (ref 260–445)
URINE CULTURE, ROUTINE: ABNORMAL
VITAMIN B-12: 1031 PG/ML (ref 211–911)
WBC # BLD: 7.2 K/UL (ref 4–11)

## 2020-01-07 PROCEDURE — 83036 HEMOGLOBIN GLYCOSYLATED A1C: CPT

## 2020-01-07 PROCEDURE — 82746 ASSAY OF FOLIC ACID SERUM: CPT

## 2020-01-07 PROCEDURE — 83540 ASSAY OF IRON: CPT

## 2020-01-07 PROCEDURE — 94760 N-INVAS EAR/PLS OXIMETRY 1: CPT

## 2020-01-07 PROCEDURE — 2580000003 HC RX 258: Performed by: INTERNAL MEDICINE

## 2020-01-07 PROCEDURE — 82728 ASSAY OF FERRITIN: CPT

## 2020-01-07 PROCEDURE — 82607 VITAMIN B-12: CPT

## 2020-01-07 PROCEDURE — 1200000000 HC SEMI PRIVATE

## 2020-01-07 PROCEDURE — 85025 COMPLETE CBC W/AUTO DIFF WBC: CPT

## 2020-01-07 PROCEDURE — 80048 BASIC METABOLIC PNL TOTAL CA: CPT

## 2020-01-07 PROCEDURE — 83550 IRON BINDING TEST: CPT

## 2020-01-07 PROCEDURE — 6360000002 HC RX W HCPCS: Performed by: INTERNAL MEDICINE

## 2020-01-07 PROCEDURE — 6370000000 HC RX 637 (ALT 250 FOR IP): Performed by: UROLOGY

## 2020-01-07 PROCEDURE — 36415 COLL VENOUS BLD VENIPUNCTURE: CPT

## 2020-01-07 PROCEDURE — 6370000000 HC RX 637 (ALT 250 FOR IP): Performed by: INTERNAL MEDICINE

## 2020-01-07 RX ADMIN — Medication 1 G: at 18:39

## 2020-01-07 RX ADMIN — ACETAMINOPHEN 650 MG: 325 TABLET, FILM COATED ORAL at 13:34

## 2020-01-07 RX ADMIN — ACETAMINOPHEN 650 MG: 325 TABLET, FILM COATED ORAL at 21:35

## 2020-01-07 RX ADMIN — Medication 10 ML: at 08:41

## 2020-01-07 RX ADMIN — PHENAZOPYRIDINE HYDROCHLORIDE 200 MG: 100 TABLET ORAL at 17:30

## 2020-01-07 RX ADMIN — ACETAMINOPHEN 650 MG: 325 TABLET, FILM COATED ORAL at 17:30

## 2020-01-07 RX ADMIN — Medication 10 ML: at 21:36

## 2020-01-07 RX ADMIN — TAMSULOSIN HYDROCHLORIDE 0.4 MG: 0.4 CAPSULE ORAL at 08:40

## 2020-01-07 RX ADMIN — PHENAZOPYRIDINE HYDROCHLORIDE 200 MG: 100 TABLET ORAL at 08:40

## 2020-01-07 RX ADMIN — PHENAZOPYRIDINE HYDROCHLORIDE 200 MG: 100 TABLET ORAL at 11:47

## 2020-01-07 ASSESSMENT — PAIN SCALES - GENERAL
PAINLEVEL_OUTOF10: 0
PAINLEVEL_OUTOF10: 4
PAINLEVEL_OUTOF10: 4
PAINLEVEL_OUTOF10: 0
PAINLEVEL_OUTOF10: 0

## 2020-01-07 NOTE — PLAN OF CARE
Problem: Pain:  Goal: Control of acute pain  Description  Control of acute pain  Outcome: Ongoing     Patient has had no pain throughout shift thus far.

## 2020-01-07 NOTE — PLAN OF CARE
Problem: Falls - Risk of:  Goal: Will remain free from falls  Description  Will remain free from falls  Outcome: Ongoing     Problem: Pain:  Goal: Pain level will decrease  Description  Pain level will decrease  Outcome: Ongoing  Goal: Control of chronic pain  Description  Control of chronic pain  Outcome: Ongoing

## 2020-01-07 NOTE — PROGRESS NOTES
4 Eyes Skin Assessment      The patient is being assess for  Admission     I agree that 2 RN's have performed a thorough Head to Toe Skin Assessment on the patient. ALL assessment sites listed below have been assessed. Areas assessed by both nurses:   [x]   Head, Face, and Ears   [x]   Shoulders, Back, and Chest  [x]   Arms, Elbows, and Hands   [x]   Coccyx, Sacrum, and IschIum  [x]   Legs, Feet, and Heels                        Does the Patient have Skin Breakdown?   No         Evens Prevention initiated:  NA   Wound Care Orders initiated:  NA      [facility] nurse consulted for Pressure Injury (Stage 3,4, Unstageable, DTI, NWPT, and Complex wounds), New and Established Ostomies:  NA       Nurse 1 eSignature: Electronically signed by Tom Dunn RN on 1/6/20 at 7:07 PM     **SHARE this note so that the co-signing nurse is able to place an eSignature**     Nurse 2 eSignature: Electronically signed by Brea Rodriguez RN on 1/6/20 at 7:20 PM
DR Raheem Bgaley notified
MD notified via [facility] as follows:    \"Pt requesting to be off IVF, eating and drinking normally. Please advise. \"
Patient has had an excellent appetite throughout shift, is ambulating independently, is urinating and defecating normally, and has had no complaints of pain. Full bed change and gown changed d/t night sweats. Will continue to monitor.
Patient resting in bed with visitor Mackenzie at bedside. Patient requested to be taken off of IVF, tolerating PO well. Spoke w/ MD [place], IVF dc'd per order.
Patient to SDS for Hold until room available, patient denies pain/needs, food tray ordered, will continue to monitor
Urology Progress Note  [facility]    Provider: Bishop Castillo MD Patient ID:  Admission Date: 2020 Name: Neel Pimentel  OR Date: 2020 MRN: [id number]   Patient Location: [id number] : 1975  Attending: Janet Baez MD Date of Service: 2020  PCP: No primary care provider on file. Diagnoses:  Ureteral stone  UTI    Assessment/Plan:  Culture shows Klebsiella pneumonia in the urine. Sainz S. Can change to bactrim DS for 7 days of ABX. Follow up with urology for stent and stone management, 1 week with Dr. Gio Ferreira  Discharge home soon. The patient had a chance to ask questions which were answered. she understands the above plan. Subjective:   Neel Pimentel is a 38 y.o. female. She was seen and examined this morning. Today we discussed stent, stone, and labs up to this point. She has been eating well. Objective:   Vitals:  Vitals:    20 0828   BP: (!) 137/98   Pulse: 97   Resp: 16   Temp: 98.3 °F (36.8 °C)   SpO2: 95%       Intake/Output Summary (Last 24 hours) at 2020 0852  Last data filed at 2020 0840  Gross per 24 hour   Intake 1330 ml   Output 200 ml   Net 1130 ml     Physical Exam:  Gen: Alert and oriented x3, no acute distress  CV: Regular rate   Resp: unlabored respirations  Abd: Soft, non-distended, non-tender, no masses  Ext: no peripheral edema noted, moves upper and lower extremities spontaneously  Skin: warm and well perfused, no rashes noted on the face, or arms.      Labs:  Lab Results   Component Value Date    WBC 7.2 2020    HGB 9.9 (L) 2020    HCT 30.0 (L) 2020    MCV 69.5 (L) 2020     2020     Lab Results   Component Value Date    CREATININE 0.6 2020    BUN 12 2020     2020    K 3.7 2020    K 3.7 2020     2020    CO2 23 2020       Bishop Castillo MD  2020
138   K 3.8 3.7  3.7   CL 98* 103   CO2 20* 23   BUN 13 12   CREATININE 0.7 0.6   GLUCOSE 132* 156*     Hepatic:   Recent Labs     01/05/20 2018   AST 15   ALT 17   BILITOT 0.3   ALKPHOS 111     FL RETROGRADE PYELOGRAM LEFT   Final Result   Intraprocedural fluoroscopic spot images as above. See separate procedure   report for more information. CT ABDOMEN PELVIS W IV CONTRAST Additional Contrast? None   Final Result   1. Mid left ureteral obstructing 6 mm diameter calculus with associated   moderate left ureterectasis and moderate left hydronephrosis, with findings   as discussed above. 2. Diffuse fatty infiltration of liver. 3. Hepatomegaly. 4. Suspected multifocal uterine leiomyomata, as discussed above. 5. Cholecystectomy. Recent imaging reviewed    Problem List  Active Problems:    Nephrolithiasis  Resolved Problems:    * No resolved hospital problems.  *       Assessment & Plan:   Assessment/Plan:   sepsis secondary pyelonephritis secondary to left hydronephrosis from 6 mm ureteral stone  - s/p left stent by urology, on board  - rocephin day 2  - f/u bc thus far neg x 24 hours       Acute hyponatremia  - iresolved, stop fluids     Hyperglycemia: a1c pending will follow up     microcytic anemia: iron b12, folate pending will f/u     DVT prophylaxis lovenox  Code status full code      Zach Kat MD   1/7/2020 9:10 AM

## 2020-01-08 VITALS
HEART RATE: 105 BPM | OXYGEN SATURATION: 95 % | DIASTOLIC BLOOD PRESSURE: 80 MMHG | WEIGHT: 267 LBS | TEMPERATURE: 97.4 F | RESPIRATION RATE: 16 BRPM | SYSTOLIC BLOOD PRESSURE: 120 MMHG | BODY MASS INDEX: 52.42 KG/M2 | HEIGHT: 60 IN

## 2020-01-08 LAB
ANION GAP SERPL CALCULATED.3IONS-SCNC: 15 MMOL/L (ref 3–16)
ANISOCYTOSIS: ABNORMAL
BASOPHILS ABSOLUTE: 0 K/UL (ref 0–0.2)
BASOPHILS RELATIVE PERCENT: 0 %
BUN BLDV-MCNC: 10 MG/DL (ref 7–20)
CALCIUM SERPL-MCNC: 9.6 MG/DL (ref 8.3–10.6)
CHLORIDE BLD-SCNC: 98 MMOL/L (ref 99–110)
CO2: 23 MMOL/L (ref 21–32)
CREAT SERPL-MCNC: 0.5 MG/DL (ref 0.6–1.1)
EOSINOPHILS ABSOLUTE: 0.1 K/UL (ref 0–0.6)
EOSINOPHILS RELATIVE PERCENT: 1 %
GFR AFRICAN AMERICAN: >60
GFR NON-AFRICAN AMERICAN: >60
GLUCOSE BLD-MCNC: 113 MG/DL (ref 70–99)
HCT VFR BLD CALC: 29.9 % (ref 36–48)
HEMOGLOBIN: 9.8 G/DL (ref 12–16)
LYMPHOCYTES ABSOLUTE: 2.1 K/UL (ref 1–5.1)
LYMPHOCYTES RELATIVE PERCENT: 33 %
MCH RBC QN AUTO: 23 PG (ref 26–34)
MCHC RBC AUTO-ENTMCNC: 32.9 G/DL (ref 31–36)
MCV RBC AUTO: 70 FL (ref 80–100)
MICROCYTES: ABNORMAL
MONOCYTES ABSOLUTE: 1.1 K/UL (ref 0–1.3)
MONOCYTES RELATIVE PERCENT: 17 %
NEUTROPHILS ABSOLUTE: 3.2 K/UL (ref 1.7–7.7)
NEUTROPHILS RELATIVE PERCENT: 49 %
PDW BLD-RTO: 16.7 % (ref 12.4–15.4)
PLATELET # BLD: 375 K/UL (ref 135–450)
PLATELET SLIDE REVIEW: ADEQUATE
PMV BLD AUTO: 7.8 FL (ref 5–10.5)
POTASSIUM SERPL-SCNC: 3.1 MMOL/L (ref 3.5–5.1)
RBC # BLD: 4.28 M/UL (ref 4–5.2)
SLIDE REVIEW: ABNORMAL
SODIUM BLD-SCNC: 136 MMOL/L (ref 136–145)
WBC # BLD: 6.5 K/UL (ref 4–11)

## 2020-01-08 PROCEDURE — 6370000000 HC RX 637 (ALT 250 FOR IP): Performed by: INTERNAL MEDICINE

## 2020-01-08 PROCEDURE — 6370000000 HC RX 637 (ALT 250 FOR IP): Performed by: UROLOGY

## 2020-01-08 PROCEDURE — 80048 BASIC METABOLIC PNL TOTAL CA: CPT

## 2020-01-08 PROCEDURE — 94760 N-INVAS EAR/PLS OXIMETRY 1: CPT

## 2020-01-08 PROCEDURE — 85025 COMPLETE CBC W/AUTO DIFF WBC: CPT

## 2020-01-08 PROCEDURE — 36415 COLL VENOUS BLD VENIPUNCTURE: CPT

## 2020-01-08 RX ORDER — TAMSULOSIN HYDROCHLORIDE 0.4 MG/1
0.4 CAPSULE ORAL DAILY
Qty: 30 CAPSULE | Refills: 3 | Status: SHIPPED | OUTPATIENT
Start: 2020-01-08 | End: 2020-01-08

## 2020-01-08 RX ORDER — CIPROFLOXACIN 500 MG/1
500 TABLET, FILM COATED ORAL 2 TIMES DAILY
Qty: 10 TABLET | Refills: 0 | Status: SHIPPED | OUTPATIENT
Start: 2020-01-08 | End: 2020-01-13

## 2020-01-08 RX ORDER — CIPROFLOXACIN 500 MG/1
500 TABLET, FILM COATED ORAL 2 TIMES DAILY
Qty: 10 TABLET | Refills: 0 | Status: SHIPPED | OUTPATIENT
Start: 2020-01-08 | End: 2020-01-08 | Stop reason: SDUPTHER

## 2020-01-08 RX ORDER — POTASSIUM CHLORIDE 20 MEQ/1
60 TABLET, EXTENDED RELEASE ORAL ONCE
Status: COMPLETED | OUTPATIENT
Start: 2020-01-08 | End: 2020-01-08

## 2020-01-08 RX ORDER — TAMSULOSIN HYDROCHLORIDE 0.4 MG/1
0.4 CAPSULE ORAL DAILY
Qty: 30 CAPSULE | Refills: 3 | Status: SHIPPED | OUTPATIENT
Start: 2020-01-08

## 2020-01-08 RX ADMIN — PHENAZOPYRIDINE HYDROCHLORIDE 200 MG: 100 TABLET ORAL at 08:42

## 2020-01-08 RX ADMIN — POTASSIUM CHLORIDE 60 MEQ: 1500 TABLET, EXTENDED RELEASE ORAL at 08:43

## 2020-01-08 RX ADMIN — ACETAMINOPHEN 650 MG: 325 TABLET, FILM COATED ORAL at 05:56

## 2020-01-08 RX ADMIN — TAMSULOSIN HYDROCHLORIDE 0.4 MG: 0.4 CAPSULE ORAL at 08:43

## 2020-01-08 ASSESSMENT — PAIN SCALES - GENERAL
PAINLEVEL_OUTOF10: 0
PAINLEVEL_OUTOF10: 5
PAINLEVEL_OUTOF10: 0
PAINLEVEL_OUTOF10: 0

## 2020-01-08 NOTE — FLOWSHEET NOTE
46 [facility] or Facility: F From: Dorina Lucas RE: Job Alejandra Pt's K+ is 3.1. Would you like to replace it before she is discharged? Thanks Need Callback: NO CALLBACK REQ 5C STEP DOWN    Order received.

## 2020-01-09 NOTE — ED NOTES
Microbiology notified this RN of positive culture from 1/6/2020. Pt was positive for klebsiella pneumoniae. Spoke with Dr. Jaylon Varela - pt was admitted and discharged home 1/8/2020. Per Dr. Jerry Tee - discharge MD - it was noted that MD was aware of positive culture and pt was on IV Zosyn and placed on po Cipro to take at home. Per Dr. Jaylon Varela, no further action needed.      Lorenza Tello RN  01/09/20 [id number]

## 2020-01-10 LAB — BLOOD CULTURE, ROUTINE: NORMAL

## 2020-01-11 LAB
CULTURE, BLOOD 2: ABNORMAL
CULTURE, BLOOD 2: ABNORMAL
ORGANISM: ABNORMAL
ORGANISM: ABNORMAL

## 2020-10-23 ENCOUNTER — HOSPITAL ENCOUNTER (OUTPATIENT)
Dept: ULTRASOUND IMAGING | Age: 45
Discharge: HOME | End: 2020-10-23
Payer: COMMERCIAL

## 2020-10-23 PROCEDURE — 76770 US EXAM ABDO BACK WALL COMP: CPT

## 2021-04-21 LAB
HPV COMMENT: NORMAL
HPV TYPE 16: NOT DETECTED
HPV TYPE 18: NOT DETECTED
HPVOH (OTHER TYPES): NOT DETECTED

## (undated) DEVICE — CYSTO PACK: Brand: MEDLINE INDUSTRIES, INC.

## (undated) DEVICE — GOWN,SURGICAL,AURORA,SLEEVE: Brand: MEDLINE

## (undated) DEVICE — CATHETER URET 5FR L70CM OPN END SGL LUMN INJ HUB FLEXIMA

## (undated) DEVICE — BLANKET WRM W29.9XL79.1IN UP BODY FORC AIR MISTRAL-AIR

## (undated) DEVICE — GUIDEWIRE ENDOSCP L150CM DIA0.035IN TIP 3CM PTFE NIT

## (undated) DEVICE — SYRINGE MED 10ML SLIP TIP BLNT FILL AND LUERLOCK DISP

## (undated) DEVICE — Z DUP USE 2522782 SOLUTION IRRIG 1000ML STRL H2O PLAS CONTAINER UROMATIC

## (undated) DEVICE — TRAY PREP DRY W/ PREM GLV 2 APPL 6 SPNG 2 UNDPD 1 OVERWRAP

## (undated) DEVICE — BAG DRAINAGE NS

## (undated) DEVICE — GLOVE ORANGE PI 7 1/2   MSG9075

## (undated) DEVICE — Device: Brand: MEDEX

## (undated) DEVICE — SOLUTION IV IRRIG WATER 1000ML POUR BRL 2F7114